# Patient Record
Sex: FEMALE | Race: OTHER | HISPANIC OR LATINO | Employment: FULL TIME | URBAN - NONMETROPOLITAN AREA
[De-identification: names, ages, dates, MRNs, and addresses within clinical notes are randomized per-mention and may not be internally consistent; named-entity substitution may affect disease eponyms.]

---

## 2019-03-13 ENCOUNTER — OFFICE VISIT (OUTPATIENT)
Dept: URGENT CARE | Facility: PHYSICIAN GROUP | Age: 19
End: 2019-03-13
Payer: MEDICAID

## 2019-03-13 VITALS
DIASTOLIC BLOOD PRESSURE: 82 MMHG | HEART RATE: 88 BPM | RESPIRATION RATE: 14 BRPM | HEIGHT: 62 IN | BODY MASS INDEX: 39.75 KG/M2 | TEMPERATURE: 97.3 F | WEIGHT: 216 LBS | OXYGEN SATURATION: 96 % | SYSTOLIC BLOOD PRESSURE: 116 MMHG

## 2019-03-13 DIAGNOSIS — L73.9 FOLLICULITIS: ICD-10-CM

## 2019-03-13 DIAGNOSIS — E66.9 OBESITY (BMI 35.0-39.9 WITHOUT COMORBIDITY): ICD-10-CM

## 2019-03-13 PROCEDURE — 99204 OFFICE O/P NEW MOD 45 MIN: CPT | Performed by: PHYSICIAN ASSISTANT

## 2019-03-13 RX ORDER — SULFAMETHOXAZOLE AND TRIMETHOPRIM 800; 160 MG/1; MG/1
1 TABLET ORAL 2 TIMES DAILY
Qty: 20 TAB | Refills: 0 | Status: SHIPPED | OUTPATIENT
Start: 2019-03-13 | End: 2019-03-23

## 2019-03-13 NOTE — PROGRESS NOTES
"Chief Complaint   Patient presents with   • Rash       HISTORY OF PRESENT ILLNESS: Patient is a 18 y.o. female who presents today because she had her face wax a few days ago and within several hours started to develop small pimples that got worse and worse.  It is on her temple areas, cheeks, chin.  She is not putting any medications on it    Patient Active Problem List    Diagnosis Date Noted   • Obesity (BMI 35.0-39.9 without comorbidity) (formerly [facility]) 03/13/2019       Allergies:Patient has no known allergies.    Current Outpatient Prescriptions Ordered in [facility]   Medication Sig Dispense Refill   • FLUOXETINE HCL PO Take  by mouth.     • HYDROXYZINE HCL PO Take  by mouth.     • sulfamethoxazole-trimethoprim (BACTRIM DS) 800-160 MG tablet Take 1 Tab by mouth 2 times a day for 10 days. 20 Tab 0     No current Epic-ordered facility-administered medications on file.        No past medical history on file.    Social History   Substance Use Topics   • Smoking status: Current Every Day Smoker     Types: Cigarettes   • Smokeless tobacco: Never Used   • Alcohol use Not on file       No family status information on file.   No family history on file.    ROS:  Review of Systems   Constitutional: Negative for fever, chills, weight loss and malaise/fatigue.   HENT: Negative for ear pain, nosebleeds, congestion, sore throat and neck pain.    Eyes: Negative for blurred vision.   Respiratory: Negative for cough, sputum production, shortness of breath and wheezing.    Cardiovascular: Negative for chest pain, palpitations, orthopnea and leg swelling.   Gastrointestinal: Negative for heartburn, nausea, vomiting and abdominal pain.   Genitourinary: Negative for dysuria, urgency and frequency.     Exam:  Blood pressure 116/82, pulse 88, temperature 36.3 °C (97.3 °F), temperature source Temporal, resp. rate 14, height 1.575 m (5' 2\"), weight 98 kg (216 lb), SpO2 96 %.  General:  Well nourished, well developed female in NAD  Head:Normocephalic, " atraumatic  Eyes: PERRLA, EOM within normal limits, no conjunctival injection, no scleral icterus, visual fields and acuity grossly intact.  Extremities: no clubbing, cyanosis, or edema.  Skin: On her face she has dozens of small erythematous pustular lesions    Please note that this dictation was created using voice recognition software. I have made every reasonable attempt to correct obvious errors, but I expect that there are errors of grammar and possibly content that I did not discover before finalizing the note.    Assessment/Plan:  1. Folliculitis  sulfamethoxazole-trimethoprim (BACTRIM DS) 800-160 MG tablet   2. Obesity (BMI 35.0-39.9 without comorbidity)  Patient identified as having weight management issue.  Appropriate orders and counseling given.   Mild soap and water.    Followup with primary care in the next 7-10 days if not significantly improving, return to the urgent care or go to the emergency room sooner for any worsening of symptoms.

## 2019-03-13 NOTE — PATIENT INSTRUCTIONS
"Smoking Cessation, Tips for Success  If you are ready to quit smoking, congratulations! You have chosen to help yourself be healthier. Cigarettes bring nicotine, tar, carbon monoxide, and other irritants into your body. Your lungs, heart, and blood vessels will be able to work better without these poisons. There are many different ways to quit smoking. Nicotine gum, nicotine patches, a nicotine inhaler, or nicotine nasal spray can help with physical craving. Hypnosis, support groups, and medicines help break the habit of smoking.  WHAT THINGS CAN I DO TO MAKE QUITTING EASIER?   Here are some tips to help you quit for good:  · Pick a date when you will quit smoking completely. Tell all of your friends and family about your plan to quit on that date.  · Do not try to slowly cut down on the number of cigarettes you are smoking. Pick a quit date and quit smoking completely starting on that day.  · Throw away all cigarettes.    · Clean and remove all ashtrays from your home, work, and car.  · On a card, write down your reasons for quitting. Carry the card with you and read it when you get the urge to smoke.  · Cleanse your body of nicotine. Drink enough water and fluids to keep your urine clear or pale yellow. Do this after quitting to flush the nicotine from your body.  · Learn to predict your moods. Do not let a bad situation be your excuse to have a cigarette. Some situations in your life might tempt you into wanting a cigarette.  · Never have \"just one\" cigarette. It leads to wanting another and another. Remind yourself of your decision to quit.  · Change habits associated with smoking. If you smoked while driving or when feeling stressed, try other activities to replace smoking. Stand up when drinking your coffee. Brush your teeth after eating. Sit in a different chair when you read the paper. Avoid alcohol while trying to quit, and try to drink fewer caffeinated beverages. Alcohol and caffeine may urge you to " "smoke.  · Avoid foods and drinks that can trigger a desire to smoke, such as sugary or spicy foods and alcohol.  · Ask people who smoke not to smoke around you.  · Have something planned to do right after eating or having a cup of coffee. For example, plan to take a walk or exercise.  · Try a relaxation exercise to calm you down and decrease your stress. Remember, you may be tense and nervous for the first 2 weeks after you quit, but this will pass.  · Find new activities to keep your hands busy. Play with a pen, coin, or rubber band. Doodle or draw things on paper.  · Brush your teeth right after eating. This will help cut down on the craving for the taste of tobacco after meals. You can also try mouthwash.    · Use oral substitutes in place of cigarettes. Try using lemon drops, carrots, cinnamon sticks, or chewing gum. Keep them handy so they are available when you have the urge to smoke.  · When you have the urge to smoke, try deep breathing.  · Designate your home as a nonsmoking area.  · If you are a heavy smoker, ask your health care provider about a prescription for nicotine chewing gum. It can ease your withdrawal from nicotine.  · Reward yourself. Set aside the cigarette money you save and buy yourself something nice.  · Look for support from others. Join a support group or smoking cessation program. Ask someone at home or at work to help you with your plan to quit smoking.  · Always ask yourself, \"Do I need this cigarette or is this just a reflex?\" Tell yourself, \"Today, I choose not to smoke,\" or \"I do not want to smoke.\" You are reminding yourself of your decision to quit.  · Do not replace cigarette smoking with electronic cigarettes (commonly called e-cigarettes). The safety of e-cigarettes is unknown, and some may contain harmful chemicals.  · If you relapse, do not give up! Plan ahead and think about what you will do the next time you get the urge to smoke.  HOW WILL I FEEL WHEN I QUIT SMOKING?  You " may have symptoms of withdrawal because your body is used to nicotine (the addictive substance in cigarettes). You may crave cigarettes, be irritable, feel very hungry, cough often, get headaches, or have difficulty concentrating. The withdrawal symptoms are only temporary. They are strongest when you first quit but will go away within 10-14 days. When withdrawal symptoms occur, stay in control. Think about your reasons for quitting. Remind yourself that these are signs that your body is healing and getting used to being without cigarettes. Remember that withdrawal symptoms are easier to treat than the major diseases that smoking can cause.   Even after the withdrawal is over, expect periodic urges to smoke. However, these cravings are generally short lived and will go away whether you smoke or not. Do not smoke!  WHAT RESOURCES ARE AVAILABLE TO HELP ME QUIT SMOKING?  Your health care provider can direct you to community resources or hospitals for support, which may include:  · Group support.  · Education.  · Hypnosis.  · Therapy.     This information is not intended to replace advice given to you by your health care provider. Make sure you discuss any questions you have with your health care provider.     Document Released: 09/15/2005 Document Revised: 01/08/2016 Document Reviewed: 06/05/2014  [employer] Interactive Patient Education ©2016 [employer]

## 2019-06-03 ENCOUNTER — OFFICE VISIT (OUTPATIENT)
Dept: URGENT CARE | Facility: PHYSICIAN GROUP | Age: 19
End: 2019-06-03
Payer: MEDICAID

## 2019-06-03 VITALS
RESPIRATION RATE: 16 BRPM | BODY MASS INDEX: 41.77 KG/M2 | OXYGEN SATURATION: 95 % | DIASTOLIC BLOOD PRESSURE: 82 MMHG | WEIGHT: 227 LBS | HEART RATE: 100 BPM | TEMPERATURE: 97.1 F | HEIGHT: 62 IN | SYSTOLIC BLOOD PRESSURE: 122 MMHG

## 2019-06-03 DIAGNOSIS — J02.9 EXUDATIVE PHARYNGITIS: ICD-10-CM

## 2019-06-03 PROCEDURE — 99214 OFFICE O/P EST MOD 30 MIN: CPT | Performed by: PHYSICIAN ASSISTANT

## 2019-06-03 RX ORDER — AMOXICILLIN 875 MG/1
875 TABLET, COATED ORAL 2 TIMES DAILY
Qty: 20 TAB | Refills: 0 | Status: SHIPPED | OUTPATIENT
Start: 2019-06-03 | End: 2019-06-13

## 2019-06-03 NOTE — PROGRESS NOTES
"Chief Complaint   Patient presents with   • Pharyngitis       HISTORY OF PRESENT ILLNESS: Patient is a 18 y.o. female who presents today because she has a 2 to 3-day history of sore throat.  She is a white pockets on her throat yesterday so she came in for evaluation today.  She has been taking some ibuprofen which did not really help.    Patient Active Problem List    Diagnosis Date Noted   • Obesity (BMI 35.0-39.9 without comorbidity) ([facility]) 03/13/2019       Allergies:Patient has no known allergies.    Current Outpatient Prescriptions Ordered in [facility]   Medication Sig Dispense Refill   • amoxicillin (AMOXIL) 875 MG tablet Take 1 Tab by mouth 2 times a day for 10 days. 20 Tab 0   • FLUOXETINE HCL PO Take  by mouth.     • HYDROXYZINE HCL PO Take  by mouth.       No current Epic-ordered facility-administered medications on file.        No past medical history on file.    Social History   Substance Use Topics   • Smoking status: Current Every Day Smoker     Types: Cigarettes   • Smokeless tobacco: Never Used   • Alcohol use Not on file       No family status information on file.   No family history on file.    ROS:  Review of Systems   Constitutional: Patient reports subjective fever, no chills, weight loss and malaise/fatigue.   HENT: Negative for ear pain, nosebleeds, congestion, positive for sore throat and neck pain.    Eyes: Negative for blurred vision.   Respiratory: Negative for cough, sputum production, shortness of breath and wheezing.    Cardiovascular: Negative for chest pain, palpitations, orthopnea and leg swelling.   Gastrointestinal: Negative for heartburn, nausea, vomiting and abdominal pain.   Genitourinary: Negative for dysuria, urgency and frequency.     Exam:  /82 (BP Location: Right arm, Patient Position: Sitting, BP Cuff Size: Large adult)   Pulse 100   Temp 36.2 °C (97.1 °F) (Temporal)   Resp 16   Ht 1.575 m (5' 2\")   Wt 103 kg (227 lb)   SpO2 95%   General:  Well nourished, well " developed female in NAD  Head:Normocephalic, atraumatic  Eyes: PERRLA, EOM within normal limits, no conjunctival injection, no scleral icterus, visual fields and acuity grossly intact.  Ears: Normal shape and symmetry, no tenderness, no discharge. External canals are without any significant edema or erythema. Tympanic membranes are without any inflammation, no effusion. Gross auditory acuity is intact  Nose: Symmetrical without tenderness, no discharge.  Mouth: reasonable hygiene, she has pharyngeal and tonsillar erythema, bilateral exudates and bilateral tonsillar enlargement.  Uvula is midline  Neck: There is bilateral anterior cervical lymph node enlargement and tenderness, range of motion within normal limits, no tracheal deviation. No obvious thyroid enlargement.  Extremities: no clubbing, cyanosis, or edema.    Please note that this dictation was created using voice recognition software. I have made every reasonable attempt to correct obvious errors, but I expect that there are errors of grammar and possibly content that I did not discover before finalizing the note.    Assessment/Plan:  1. Exudative pharyngitis  amoxicillin (AMOXIL) 875 MG tablet   Tylenol or ibuprofen as tolerated.    Followup with primary care in the next 7-10 days if not significantly improving, return to the urgent care or go to the emergency room sooner for any worsening of symptoms.

## 2019-06-03 NOTE — PATIENT INSTRUCTIONS
Steps to Quit Smoking  Smoking tobacco can be harmful to your health and can affect almost every organ in your body. Smoking puts you, and those around you, at risk for developing many serious chronic diseases. Quitting smoking is difficult, but it is one of the best things that you can do for your health. It is never too late to quit.  What are the benefits of quitting smoking?  When you quit smoking, you lower your risk of developing serious diseases and conditions, such as:  · Lung cancer or lung disease, such as COPD.  · Heart disease.  · Stroke.  · Heart attack.  · Infertility.  · Osteoporosis and bone fractures.  Additionally, symptoms such as coughing, wheezing, and shortness of breath may get better when you quit. You may also find that you get sick less often because your body is stronger at fighting off colds and infections. If you are pregnant, quitting smoking can help to reduce your chances of having a baby of low birth weight.  How do I get ready to quit?  When you decide to quit smoking, create a plan to make sure that you are successful. Before you quit:  · Pick a date to quit. Set a date within the next two weeks to give you time to prepare.  · Write down the reasons why you are quitting. Keep this list in places where you will see it often, such as on your bathroom mirror or in your car or wallet.  · Identify the people, places, things, and activities that make you want to smoke (triggers) and avoid them. Make sure to take these actions:  ¨ Throw away all cigarettes at home, at work, and in your car.  ¨ Throw away smoking accessories, such as ashtrays and lighters.  ¨ Clean your car and make sure to empty the ashtray.  ¨ Clean your home, including curtains and carpets.  · Tell your family, friends, and coworkers that you are quitting. Support from your loved ones can make quitting easier.  · Talk with your health care provider about your options for quitting smoking.  · Find out what treatment  options are covered by your health insurance.  What strategies can I use to quit smoking?  Talk with your healthcare provider about different strategies to quit smoking. Some strategies include:  · Quitting smoking altogether instead of gradually lessening how much you smoke over a period of time. Research shows that quitting “cold turkey” is more successful than gradually quitting.  · Attending in-person counseling to help you build problem-solving skills. You are more likely to have success in quitting if you attend several counseling sessions. Even short sessions of 10 minutes can be effective.  · Finding resources and support systems that can help you to quit smoking and remain smoke-free after you quit. These resources are most helpful when you use them often. They can include:  ¨ Online chats with a counselor.  ¨ Telephone quitlines.  ¨ Printed self-help materials.  ¨ Support groups or group counseling.  ¨ Text messaging programs.  ¨ Mobile phone applications.  · Taking medicines to help you quit smoking. (If you are pregnant or breastfeeding, talk with your health care provider first.) Some medicines contain nicotine and some do not. Both types of medicines help with cravings, but the medicines that include nicotine help to relieve withdrawal symptoms. Your health care provider may recommend:  ¨ Nicotine patches, gum, or lozenges.  ¨ Nicotine inhalers or sprays.  ¨ Non-nicotine medicine that is taken by mouth.  Talk with your health care provider about combining strategies, such as taking medicines while you are also receiving in-person counseling. Using these two strategies together makes you more likely to succeed in quitting than if you used either strategy on its own.  If you are pregnant or breastfeeding, talk with your health care provider about finding counseling or other support strategies to quit smoking. Do not take medicine to help you quit smoking unless told to do so by your health care  provider.  What things can I do to make it easier to quit?  Quitting smoking might feel overwhelming at first, but there is a lot that you can do to make it easier. Take these important actions:  · Reach out to your family and friends and ask that they support and encourage you during this time. Call telephone quitlines, reach out to support groups, or work with a counselor for support.  · Ask people who smoke to avoid smoking around you.  · Avoid places that trigger you to smoke, such as bars, parties, or smoke-break areas at work.  · Spend time around people who do not smoke.  · Lessen stress in your life, because stress can be a smoking trigger for some people. To lessen stress, try:  ¨ Exercising regularly.  ¨ Deep-breathing exercises.  ¨ Yoga.  ¨ Meditating.  ¨ Performing a body scan. This involves closing your eyes, scanning your body from head to toe, and noticing which parts of your body are particularly tense. Purposefully relax the muscles in those areas.  · Download or purchase mobile phone or tablet apps (applications) that can help you stick to your quit plan by providing reminders, tips, and encouragement. There are many free apps, such as QuitGuide from the CDC (Centers for Disease Control and Prevention). You can find other support for quitting smoking (smoking cessation) through smokefree.gov and other websites.  How will I feel when I quit smoking?  Within the first 24 hours of quitting smoking, you may start to feel some withdrawal symptoms. These symptoms are usually most noticeable 2-3 days after quitting, but they usually do not last beyond 2-3 weeks. Changes or symptoms that you might experience include:  · Mood swings.  · Restlessness, anxiety, or irritation.  · Difficulty concentrating.  · Dizziness.  · Strong cravings for sugary foods in addition to nicotine.  · Mild weight gain.  · Constipation.  · Nausea.  · Coughing or a sore throat.  · Changes in how your medicines work in your  body.  · A depressed mood.  · Difficulty sleeping (insomnia).  After the first 2-3 weeks of quitting, you may start to notice more positive results, such as:  · Improved sense of smell and taste.  · Decreased coughing and sore throat.  · Slower heart rate.  · Lower blood pressure.  · Clearer skin.  · The ability to breathe more easily.  · Fewer sick days.  Quitting smoking is very challenging for most people. Do not get discouraged if you are not successful the first time. Some people need to make many attempts to quit before they achieve long-term success. Do your best to stick to your quit plan, and talk with your health care provider if you have any questions or concerns.  This information is not intended to replace advice given to you by your health care provider. Make sure you discuss any questions you have with your health care provider.  Document Released: 12/12/2002 Document Revised: 08/15/2017 Document Reviewed: 05/03/2016  [employer] Interactive Patient Education © 2017 [employer]

## 2023-05-18 ENCOUNTER — OFFICE VISIT (OUTPATIENT)
Dept: URGENT CARE | Facility: PHYSICIAN GROUP | Age: 23
End: 2023-05-18
Payer: MEDICAID

## 2023-05-18 VITALS
HEIGHT: 63 IN | HEART RATE: 113 BPM | SYSTOLIC BLOOD PRESSURE: 122 MMHG | BODY MASS INDEX: 41.46 KG/M2 | TEMPERATURE: 97.2 F | OXYGEN SATURATION: 95 % | DIASTOLIC BLOOD PRESSURE: 80 MMHG | WEIGHT: 234 LBS | RESPIRATION RATE: 16 BRPM

## 2023-05-18 DIAGNOSIS — F17.210 TOBACCO DEPENDENCE DUE TO CIGARETTES: ICD-10-CM

## 2023-05-18 DIAGNOSIS — R11.2 NAUSEA VOMITING AND DIARRHEA: ICD-10-CM

## 2023-05-18 DIAGNOSIS — R19.7 NAUSEA VOMITING AND DIARRHEA: ICD-10-CM

## 2023-05-18 DIAGNOSIS — B34.9 ACUTE VIRAL SYNDROME: ICD-10-CM

## 2023-05-18 PROCEDURE — 3074F SYST BP LT 130 MM HG: CPT | Performed by: NURSE PRACTITIONER

## 2023-05-18 PROCEDURE — 99213 OFFICE O/P EST LOW 20 MIN: CPT | Performed by: NURSE PRACTITIONER

## 2023-05-18 PROCEDURE — 3079F DIAST BP 80-89 MM HG: CPT | Performed by: NURSE PRACTITIONER

## 2023-05-18 RX ORDER — ONDANSETRON 4 MG/1
TABLET, ORALLY DISINTEGRATING ORAL
Qty: 15 TABLET | Refills: 0 | Status: SHIPPED | OUTPATIENT
Start: 2023-05-18

## 2023-05-18 RX ORDER — DICYCLOMINE HCL 20 MG
TABLET ORAL
Qty: 20 TABLET | Refills: 0 | Status: SHIPPED | OUTPATIENT
Start: 2023-05-18

## 2023-05-18 RX ORDER — NORGESTIMATE AND ETHINYL ESTRADIOL 0.25-0.035
1 KIT ORAL
COMMUNITY
Start: 2023-04-11

## 2023-05-18 ASSESSMENT — ENCOUNTER SYMPTOMS
MYALGIAS: 1
NAUSEA: 1
FEVER: 0
CHILLS: 1
ABDOMINAL PAIN: 1
HEADACHES: 0
VOMITING: 1
SORE THROAT: 0
DIARRHEA: 1
BLOOD IN STOOL: 0
DIZZINESS: 1

## 2023-05-18 NOTE — PROGRESS NOTES
Patient has consented to treatment and for use of patient information for treatment and billing purposes.    Chief Complaint:    Chief Complaint   Patient presents with    Nausea     X2 days diarrhea and nausea,able to keep fluids down but not food has not had solid food for more then 1 day, stomach cramps, body aches, vomiting        History of Present Illness: 22 y.o.  female presents to clinic with 3 days history of N/V/D, abdominal cramping, body aches, chills, fatigue, and mild dizziness.  Patient states that her diarrhea is very watery in nature.  She vomited approximately 3 times yesterday and once today.  She did try taking over-the-counter Imodium without any symptom relief.  She has been able to hold down small sips of water.  She is intolerant to food.  She states that she is on birth control but is sexually active.      Review of Systems   Constitutional:  Positive for chills and malaise/fatigue. Negative for fever.   HENT:  Negative for sore throat.    Gastrointestinal:  Positive for abdominal pain, diarrhea, nausea and vomiting. Negative for blood in stool and melena.   Musculoskeletal:  Positive for myalgias.   Neurological:  Positive for dizziness. Negative for headaches.       Medications, Allergies, and current problem list reviewed today in Epic.    Physical Exam:    Vitals:    05/18/23 1530   BP: 122/80   Pulse: (!) 113   Resp: 16   Temp: 36.2 °C (97.2 °F)   SpO2: 95%             Physical Exam  Constitutional:       General: She is not in acute distress.     Appearance: She is not ill-appearing or toxic-appearing.   HENT:      Head: Normocephalic.      Nose: Nose normal.      Mouth/Throat:      Mouth: Mucous membranes are moist.   Eyes:      Extraocular Movements: Extraocular movements intact.      Conjunctiva/sclera: Conjunctivae normal.      Pupils: Pupils are equal, round, and reactive to light.   Cardiovascular:      Rate and Rhythm: Normal rate.   Pulmonary:      Effort: Pulmonary effort is  normal.   Abdominal:      General: Abdomen is flat. Bowel sounds are increased.      Palpations: Abdomen is soft. There is no hepatomegaly or splenomegaly.      Tenderness: There is abdominal tenderness in the epigastric area. There is no guarding or rebound. Negative signs include Bran's sign and McBurney's sign.   Musculoskeletal:         General: Normal range of motion.      Cervical back: Normal range of motion and neck supple.   Skin:     General: Skin is warm and dry.   Neurological:      General: No focal deficit present.      Mental Status: She is alert and oriented to person, place, and time.   Psychiatric:         Mood and Affect: Mood normal.         Behavior: Behavior normal.            Medical Decision Making and Plan:  I personally reviewed prior external notes and test results pertinent to today's visit.   Shared decision-making was utilized with patient did develop treatment plan and clinic course.     Ekaterina, 22 y.o. female, presents to clinic with nausea, vomiting, and diarrhea.  HPI and physical exam findings are consistent with viral syndrome such as norovirus.  Patient's tachycardic in clinic secondary to mild dehydration.  Patient's mucous membranes are moist and she is tolerating small sips of p.o. fluids.  Patient was given prescription of Zofran and Bentyl.  Low suspicion at this time for appendicitis, peritonitis, or cholecystitis.  Recommended patient stay on clear liquid diet for at least 10 to 12 hours and increase as tolerated.    Differential diagnosis, natural history, supportive care, and indications for immediate follow-up discussed.    The patient remained stable during the urgent care visit.      1. Acute viral syndrome    2. Nausea vomiting and diarrhea  - POCT PREGNANCY  - ondansetron (ZOFRAN ODT) 4 MG TABLET DISPERSIBLE; 1 TAB, ALLOW TO DISINTEGRATE IN MOUTH, EVERY 8 HOURS ONLY IF NEEDED FOR NAUSEA OR VOMITING.  Dispense: 15 Tablet; Refill: 0  - dicyclomine (BENTYL) 20 MG  Tab; 1 TAB BY MOUTH EVERY 6 HOURS ONLY IF NEEDED FOR DIARRHEA AND/OR ABDOMINAL CRAMPS.  Dispense: 20 Tablet; Refill: 0    3. Tobacco dependence due to cigarettes    Other orders  - ESTARYLLA 0.25-35 MG-MCG per tablet; Take 1 Tablet by mouth every day.      Patient was encouraged to monitor symptoms closely. Those signs and symptoms which would warrant concern and mandate seeking a higher level of service through the emergency department discussed at length.  Patient stated agreement and understanding of this plan of care.    Follow up:  Advised the patient to follow-up with the primary care physician for recheck, reevaluation, and consideration of further management.  Patient verbalized understanding of treatment plan and has no further questions regarding care.      Disposition:  Home in stable condition       Voice Recognition Disclaimer:  Portions of this document were created using voice recognition software. The software does have a chance of producing errors of grammar and possibly content. I have made every reasonable attempt to correct obvious errors, but there may be errors of grammar and possibly content that I did not discover before finalizing the documentation.

## 2023-05-18 NOTE — LETTER
[facility] URGENT CARE [place]  [facility] [address]     May 18, 2023    Patient: Esther Lr   YOB: 2000   Date of Visit: 5/18/2023       To Whom It May Concern:    Esther Lr was seen and treated in our department on 5/18/2023.     Sincerely,     JEES Morris.

## 2023-05-22 ENCOUNTER — OFFICE VISIT (OUTPATIENT)
Dept: URGENT CARE | Facility: PHYSICIAN GROUP | Age: 23
End: 2023-05-22
Payer: MEDICAID

## 2023-05-22 VITALS
HEART RATE: 114 BPM | TEMPERATURE: 96.7 F | DIASTOLIC BLOOD PRESSURE: 80 MMHG | WEIGHT: 225 LBS | SYSTOLIC BLOOD PRESSURE: 112 MMHG | HEIGHT: 63 IN | BODY MASS INDEX: 39.87 KG/M2 | OXYGEN SATURATION: 94 % | RESPIRATION RATE: 14 BRPM

## 2023-05-22 DIAGNOSIS — R19.7 NAUSEA VOMITING AND DIARRHEA: ICD-10-CM

## 2023-05-22 DIAGNOSIS — R11.2 NAUSEA VOMITING AND DIARRHEA: ICD-10-CM

## 2023-05-22 DIAGNOSIS — N30.01 ACUTE CYSTITIS WITH HEMATURIA: ICD-10-CM

## 2023-05-22 DIAGNOSIS — R10.9 ABDOMINAL CRAMPING: ICD-10-CM

## 2023-05-22 LAB
APPEARANCE UR: CLEAR
BILIRUB UR STRIP-MCNC: NORMAL MG/DL
COLOR UR AUTO: NORMAL
GLUCOSE UR STRIP.AUTO-MCNC: 100 MG/DL
KETONES UR STRIP.AUTO-MCNC: 40 MG/DL
LEUKOCYTE ESTERASE UR QL STRIP.AUTO: NORMAL
NITRITE UR QL STRIP.AUTO: POSITIVE
PH UR STRIP.AUTO: 5 [PH] (ref 5–8)
POCT INT CON NEG: NEGATIVE
POCT INT CON POS: POSITIVE
POCT URINE PREGNANCY TEST: NEGATIVE
PROT UR QL STRIP: >=300 MG/DL
RBC UR QL AUTO: NORMAL
SP GR UR STRIP.AUTO: 1.01
UROBILINOGEN UR STRIP-MCNC: 4 MG/DL

## 2023-05-22 PROCEDURE — 81025 URINE PREGNANCY TEST: CPT | Performed by: NURSE PRACTITIONER

## 2023-05-22 PROCEDURE — 99214 OFFICE O/P EST MOD 30 MIN: CPT | Mod: 25 | Performed by: NURSE PRACTITIONER

## 2023-05-22 PROCEDURE — 3074F SYST BP LT 130 MM HG: CPT | Performed by: NURSE PRACTITIONER

## 2023-05-22 PROCEDURE — 3079F DIAST BP 80-89 MM HG: CPT | Performed by: NURSE PRACTITIONER

## 2023-05-22 PROCEDURE — 81002 URINALYSIS NONAUTO W/O SCOPE: CPT | Performed by: NURSE PRACTITIONER

## 2023-05-22 RX ORDER — METRONIDAZOLE 500 MG/1
500 TABLET ORAL 2 TIMES DAILY
Qty: 14 TABLET | Refills: 0 | Status: CANCELLED | OUTPATIENT
Start: 2023-05-22 | End: 2023-05-29

## 2023-05-22 RX ORDER — CEFDINIR 300 MG/1
300 CAPSULE ORAL 2 TIMES DAILY
Qty: 14 CAPSULE | Refills: 0 | Status: SHIPPED | OUTPATIENT
Start: 2023-05-22 | End: 2023-05-29

## 2023-05-22 ASSESSMENT — ENCOUNTER SYMPTOMS
SORE THROAT: 0
FLANK PAIN: 0
HEADACHES: 0
ABDOMINAL PAIN: 1
FEVER: 0
DIZZINESS: 0
DIARRHEA: 1
VOMITING: 1
NAUSEA: 1
MYALGIAS: 0
CHILLS: 0

## 2023-05-22 NOTE — LETTER
[facility] URGENT CARE [place]  [facility] [address]     May 22, 2023    Patient: Esther Lr   YOB: 2000   Date of Visit: 5/22/2023       To Whom It May Concern:    Esther Lr was seen and treated in our department on 5/22/2023.  Will need to be excused from work due to illness and may return on 5/23/2023 without restrictions.    Sincerely,     JESE Morris.

## 2023-05-22 NOTE — PROGRESS NOTES
Patient has consented to treatment and for use of patient information for treatment and billing purposes.    Chief Complaint:    Chief Complaint   Patient presents with    Emesis     Still having the same symptoms as last time, Diarrhea, vomiting, nausea, not able to eat solids with out vomiting        History of Present Illness: 22 y.o.  female presents to clinic with with continuing symptoms of intermittent diarrhea, nausea, vomiting, and epigastric cramping.    Patient states that her diarrhea is watery in nature, denies any blood or mucus.  Having about 4-5 diarrhea episodes per day.    Patient was seen last week on 5/18/2023 with similar symptoms but also had body aches, chills, fatigue and dizziness that is now resolved.  Patient states that she did start her period last night.  He is having about 4-5 vomiting episodes per day.  She did try one Zofran which did help for 4 to 6 hours for her nausea.  She states that her symptoms do seem to be improving.  She has not picked up prescription of Bentyl and Zofran that were prescribed last visit.  She is unable to hold down solid foods but is holding down broths and liquid.  She states that the smell of food causes nausea.  She has not had any fevers, chills, dysuria, or frequency with urination.      Review of Systems   Constitutional:  Negative for chills, fever and malaise/fatigue.   HENT:  Negative for sore throat.    Gastrointestinal:  Positive for abdominal pain, diarrhea, nausea and vomiting.   Genitourinary:  Negative for dysuria, flank pain, frequency, hematuria and urgency.   Musculoskeletal:  Negative for myalgias.   Neurological:  Negative for dizziness and headaches.       Medications, Allergies, and current problem list reviewed today in Epic.    Physical Exam:    Vitals:    05/22/23 1442   BP: 112/80   Pulse: (!) 114   Resp: 14   Temp: 35.9 °C (96.7 °F)   SpO2: 94%             Physical Exam  Constitutional:       General: She is not in acute  distress.     Appearance: She is not ill-appearing or toxic-appearing.   HENT:      Head: Normocephalic.      Nose: Nose normal.      Mouth/Throat:      Mouth: Mucous membranes are moist.   Eyes:      Extraocular Movements: Extraocular movements intact.      Conjunctiva/sclera: Conjunctivae normal.      Pupils: Pupils are equal, round, and reactive to light.   Cardiovascular:      Rate and Rhythm: Normal rate.   Pulmonary:      Effort: Pulmonary effort is normal.   Abdominal:      General: Abdomen is flat. Bowel sounds are decreased.      Palpations: Abdomen is soft. There is no hepatomegaly or splenomegaly.      Tenderness: There is no abdominal tenderness. There is no right CVA tenderness, left CVA tenderness, guarding or rebound. Negative signs include Bran's sign and McBurney's sign.   Musculoskeletal:         General: Normal range of motion.      Cervical back: Normal range of motion and neck supple.   Skin:     General: Skin is warm and dry.   Neurological:      General: No focal deficit present.      Mental Status: She is alert and oriented to person, place, and time.   Psychiatric:         Mood and Affect: Mood normal.         Behavior: Behavior normal.          Diagnostics:  Results for orders placed or performed in visit on 05/22/23   POCT Urinalysis   Result Value Ref Range    POC Color Red Negative    POC Appearance Clear Negative    POC Glucose 100 Negative mg/dL    POC Bilirubin Large Negative mg/dL    POC Ketones 40 Negative mg/dL    POC Specific Gravity 1.015 <1.005 - >1.030    POC Blood Large Negative    POC Urine PH 5.0 5.0 - 8.0    POC Protein >=300 Negative mg/dL    POC Urobiligen 4.0 Negative (0.2) mg/dL    POC Nitrites Positive Negative    POC Leukocyte Esterase Large Negative   POCT PREGNANCY   Result Value Ref Range    POC Urine Pregnancy Test Negative     Internal Control Positive Positive     Internal Control Negative Negative           Medical Decision Making and Plan:  I personally  reviewed prior external notes and test results pertinent to today's visit.   Shared decision-making was utilized with patient did develop treatment plan and clinic course.     Ekaterina, 22 y.o. female, presents to clinic with ongoing nausea, vomiting, and diarrhea for the past 7 days.   Patient was last seen on 5/18/2023 for similar symptoms.  Patient did not  previous prescription of Zofran and Bentyl.    Recommended patient go ahead and  prescriptions and start them to help with alleviation of symptoms.  POCT urinalysis positive for UTI, urine is red in color most likely due to menses.  Low suspicion at this time for pyelonephritis as patient is nontender in bilateral flanks.  POCT hCG negative  Prescription of Omnicef sent to pharmacy.  Recommended patient increase water to 64 ounces daily.    Differential diagnosis, natural history, supportive care, and indications for immediate follow-up discussed.    The patient remained stable during the urgent care visit.          1. Nausea vomiting and diarrhea    2. Abdominal cramping  - POCT Urinalysis  - POCT PREGNANCY    3. Acute cystitis with hematuria  - cefdinir (OMNICEF) 300 MG Cap; Take 1 Capsule by mouth 2 times a day for 7 days.  Dispense: 14 Capsule; Refill: 0      Patient was encouraged to monitor symptoms closely. Those signs and symptoms which would warrant concern and mandate seeking a higher level of service through the emergency department discussed at length.  Patient stated agreement and understanding of this plan of care.    Follow up:  Advised the patient to follow-up with the primary care physician for recheck, reevaluation, and consideration of further management.  Patient verbalized understanding of treatment plan and has no further questions regarding care.    Follow-up in urgent care or emergency department if symptoms continue or worsen in 3-5 days      Disposition:  Home in stable condition       My total time spent caring for the  patient on the day of the encounter was 32 minutes.   This does not include time spent on separately billable procedures/tests.      Voice Recognition Disclaimer:  Portions of this document were created using voice recognition software. The software does have a chance of producing errors of grammar and possibly content. I have made every reasonable attempt to correct obvious errors, but there may be errors of grammar and possibly content that I did not discover before finalizing the documentation.     Continue synthroid, repeat TFTs, FU

## 2024-11-23 ENCOUNTER — HOSPITAL ENCOUNTER (OUTPATIENT)
Facility: MEDICAL CENTER | Age: 24
End: 2024-11-23
Payer: MEDICAID

## 2024-11-23 ENCOUNTER — OFFICE VISIT (OUTPATIENT)
Dept: URGENT CARE | Facility: CLINIC | Age: 24
End: 2024-11-23
Payer: MEDICAID

## 2024-11-23 VITALS
HEIGHT: 62 IN | TEMPERATURE: 99 F | RESPIRATION RATE: 16 BRPM | DIASTOLIC BLOOD PRESSURE: 88 MMHG | HEART RATE: 120 BPM | OXYGEN SATURATION: 98 % | WEIGHT: 229 LBS | BODY MASS INDEX: 42.14 KG/M2 | SYSTOLIC BLOOD PRESSURE: 122 MMHG

## 2024-11-23 DIAGNOSIS — N76.0 BV (BACTERIAL VAGINOSIS): ICD-10-CM

## 2024-11-23 DIAGNOSIS — N89.8 VAGINAL IRRITATION: ICD-10-CM

## 2024-11-23 DIAGNOSIS — B96.89 BV (BACTERIAL VAGINOSIS): ICD-10-CM

## 2024-11-23 DIAGNOSIS — N30.01 ACUTE CYSTITIS WITH HEMATURIA: ICD-10-CM

## 2024-11-23 LAB
APPEARANCE UR: CLEAR
BILIRUB UR STRIP-MCNC: NORMAL MG/DL
COLOR UR AUTO: NORMAL
GLUCOSE UR STRIP.AUTO-MCNC: NORMAL MG/DL
KETONES UR STRIP.AUTO-MCNC: NORMAL MG/DL
LEUKOCYTE ESTERASE UR QL STRIP.AUTO: NORMAL
NITRITE UR QL STRIP.AUTO: POSITIVE
PH UR STRIP.AUTO: 6 [PH] (ref 5–8)
POCT INT CON NEG: NEGATIVE
POCT INT CON POS: POSITIVE
POCT URINE PREGNANCY TEST: NEGATIVE
PROT UR QL STRIP: 30 MG/DL
RBC UR QL AUTO: NORMAL
SP GR UR STRIP.AUTO: 1.02
UROBILINOGEN UR STRIP-MCNC: 0.2 MG/DL

## 2024-11-23 PROCEDURE — 87510 GARDNER VAG DNA DIR PROBE: CPT

## 2024-11-23 PROCEDURE — 87480 CANDIDA DNA DIR PROBE: CPT

## 2024-11-23 PROCEDURE — 3079F DIAST BP 80-89 MM HG: CPT

## 2024-11-23 PROCEDURE — 81025 URINE PREGNANCY TEST: CPT

## 2024-11-23 PROCEDURE — 87660 TRICHOMONAS VAGIN DIR PROBE: CPT

## 2024-11-23 PROCEDURE — 81002 URINALYSIS NONAUTO W/O SCOPE: CPT

## 2024-11-23 PROCEDURE — 99213 OFFICE O/P EST LOW 20 MIN: CPT | Mod: 25

## 2024-11-23 PROCEDURE — 87491 CHLMYD TRACH DNA AMP PROBE: CPT

## 2024-11-23 PROCEDURE — 3074F SYST BP LT 130 MM HG: CPT

## 2024-11-23 PROCEDURE — 87591 N.GONORRHOEAE DNA AMP PROB: CPT

## 2024-11-23 RX ORDER — NITROFURANTOIN 25; 75 MG/1; MG/1
100 CAPSULE ORAL 2 TIMES DAILY
Qty: 10 CAPSULE | Refills: 0 | Status: SHIPPED | OUTPATIENT
Start: 2024-11-23 | End: 2024-11-28

## 2024-11-24 LAB
C TRACH DNA GENITAL QL NAA+PROBE: NEGATIVE
CANDIDA DNA VAG QL PROBE+SIG AMP: NEGATIVE
G VAGINALIS DNA VAG QL PROBE+SIG AMP: POSITIVE
N GONORRHOEA DNA GENITAL QL NAA+PROBE: NEGATIVE
SPECIMEN SOURCE: NORMAL
T VAGINALIS DNA VAG QL PROBE+SIG AMP: NEGATIVE

## 2024-11-24 RX ORDER — METRONIDAZOLE 500 MG/1
500 TABLET ORAL 2 TIMES DAILY
Qty: 14 TABLET | Refills: 0 | Status: SHIPPED | OUTPATIENT
Start: 2024-11-24 | End: 2024-12-01

## 2024-11-24 NOTE — PROGRESS NOTES
"Chief Complaint   Patient presents with    UTI     Frequent urination, lower abdomen area x 1 day    Exposure to STD         Subjective:   HISTORY OF PRESENT ILLNESS: Esther Lr is a 24 y.o. female who presents for frequent and painful urination x 1 day.  She would also like to screen for STD's she is sexually active.    Patient denies vaginal discharge, pelvic pain, flank pain or fevers    Medications, Allergies, current problem list, Social and Family history reviewed today in Epic.     Objective:     /88   Pulse (!) 120   Temp 37.2 °C (99 °F) (Temporal)   Resp 16   Ht 1.575 m (5' 2\")   Wt 104 kg (229 lb)   SpO2 98%     Physical Exam  Vitals reviewed.   Constitutional:       General: She is not in acute distress.     Appearance: Normal appearance. She is not ill-appearing or toxic-appearing.   HENT:      Mouth/Throat:      Mouth: Mucous membranes are moist.   Cardiovascular:      Rate and Rhythm: Normal rate.   Pulmonary:      Effort: Pulmonary effort is normal.   Abdominal:      Palpations: Abdomen is soft.      Tenderness: There is abdominal tenderness in the suprapubic area. There is no right CVA tenderness, left CVA tenderness, guarding or rebound.   Genitourinary:     Comments: Deferred per pt  Skin:     General: Skin is warm and dry.      Capillary Refill: Capillary refill takes less than 2 seconds.   Neurological:      Mental Status: She is alert and oriented to person, place, and time.   Psychiatric:         Mood and Affect: Mood normal.          Assessment/Plan:     Diagnosis and associated orders    I personally reviewed prior external notes and test results pertinent to today's visit.     1. Vaginal irritation  POCT Urinalysis    POCT Pregnancy    VAGINAL PATHOGENS DNA PANEL    Chlamydia/GC, PCR (Genital/Anal swab)    nitrofurantoin (MACROBID) 100 MG Cap    metroNIDAZOLE (FLAGYL) 500 MG Tab      2. BV (bacterial vaginosis)  metroNIDAZOLE (FLAGYL) 500 MG Tab      3. Acute cystitis " with hematuria  POCT Urinalysis    POCT Pregnancy    VAGINAL PATHOGENS DNA PANEL    nitrofurantoin (MACROBID) 100 MG Cap        Results for orders placed or performed in visit on 11/23/24   POCT Urinalysis    Collection Time: 11/23/24  2:14 PM   Result Value Ref Range    POC Color yelllow Negative    POC Appearance clear Negative    POC Glucose neg Negative mg/dL    POC Bilirubin small Negative mg/dL    POC Ketones neg Negative mg/dL    POC Specific Gravity 1.025 <1.005 - >1.030    POC Blood moderate Negative    POC Urine PH 6.0 5.0 - 8.0    POC Protein 30 Negative mg/dL    POC Urobiligen 0.2 Negative (0.2) mg/dL    POC Nitrites positive Negative    POC Leukocyte Esterase small Negative   POCT Pregnancy    Collection Time: 11/23/24  2:14 PM   Result Value Ref Range    POC Urine Pregnancy Test Negative     Internal Control Positive Positive     Internal Control Negative Negative         IMPRESSION: The patient is well appearing here with reassuring exam and vitals signs. Symptomatology is consistent with UTI and UA is remarkable for nitrites and small LEUKS  They are discharged home with a course of macrobid.  Have completed swabs today and will notify when results are available.   Advised to refrain from sexual activity until testing and treatment complete if necessary     Differential diagnosis discussed. Pt was Educated on red flag symptoms. Pt has been Instructed to return to Urgent Care or nearest Emergency Department if symptoms fail to improve, for any change in condition, further concerns, or new concerning symptoms. Patient states understanding of the plan of care and discharge instructions.  They are discharged in stable condition.         Please note that this dictation was created using voice recognition software. I have made a reasonable attempt to correct obvious errors, but I expect that there are errors of grammar and possibly content that I did not discover before finalizing the note.    This note  was electronically signed by KINGA Buck

## 2024-12-22 ENCOUNTER — APPOINTMENT (OUTPATIENT)
Dept: RADIOLOGY | Facility: MEDICAL CENTER | Age: 24
End: 2024-12-22
Attending: EMERGENCY MEDICINE
Payer: MEDICAID

## 2024-12-22 ENCOUNTER — HOSPITAL ENCOUNTER (EMERGENCY)
Facility: MEDICAL CENTER | Age: 24
End: 2024-12-22
Attending: EMERGENCY MEDICINE
Payer: MEDICAID

## 2024-12-22 VITALS
OXYGEN SATURATION: 95 % | DIASTOLIC BLOOD PRESSURE: 83 MMHG | SYSTOLIC BLOOD PRESSURE: 136 MMHG | HEART RATE: 110 BPM | WEIGHT: 228.4 LBS | RESPIRATION RATE: 18 BRPM | TEMPERATURE: 98.1 F | BODY MASS INDEX: 42.03 KG/M2 | HEIGHT: 62 IN

## 2024-12-22 DIAGNOSIS — J06.9 UPPER RESPIRATORY TRACT INFECTION, UNSPECIFIED TYPE: ICD-10-CM

## 2024-12-22 LAB
EKG IMPRESSION: NORMAL
FLUAV RNA SPEC QL NAA+PROBE: NEGATIVE
FLUBV RNA SPEC QL NAA+PROBE: NEGATIVE
RSV RNA SPEC QL NAA+PROBE: NEGATIVE
S PYO DNA SPEC NAA+PROBE: NOT DETECTED
SARS-COV-2 RNA RESP QL NAA+PROBE: NOTDETECTED

## 2024-12-22 PROCEDURE — 71046 X-RAY EXAM CHEST 2 VIEWS: CPT

## 2024-12-22 PROCEDURE — 700102 HCHG RX REV CODE 250 W/ 637 OVERRIDE(OP): Mod: UD | Performed by: EMERGENCY MEDICINE

## 2024-12-22 PROCEDURE — 0241U HCHG SARS-COV-2 COVID-19 NFCT DS RESP RNA 4 TRGT ED POC: CPT

## 2024-12-22 PROCEDURE — 93005 ELECTROCARDIOGRAM TRACING: CPT | Mod: TC | Performed by: EMERGENCY MEDICINE

## 2024-12-22 PROCEDURE — 99406 BEHAV CHNG SMOKING 3-10 MIN: CPT

## 2024-12-22 PROCEDURE — 93005 ELECTROCARDIOGRAM TRACING: CPT | Mod: TC

## 2024-12-22 PROCEDURE — A9270 NON-COVERED ITEM OR SERVICE: HCPCS | Mod: UD | Performed by: EMERGENCY MEDICINE

## 2024-12-22 PROCEDURE — 87651 STREP A DNA AMP PROBE: CPT

## 2024-12-22 PROCEDURE — 99284 EMERGENCY DEPT VISIT MOD MDM: CPT

## 2024-12-22 RX ORDER — FLUTICASONE PROPIONATE 50 MCG
1 SPRAY, SUSPENSION (ML) NASAL DAILY
Qty: 16 G | Refills: 0 | Status: SHIPPED | OUTPATIENT
Start: 2024-12-22 | End: 2024-12-27

## 2024-12-22 RX ORDER — ALBUTEROL SULFATE 90 UG/1
2 INHALANT RESPIRATORY (INHALATION) ONCE
Status: COMPLETED | OUTPATIENT
Start: 2024-12-22 | End: 2024-12-22

## 2024-12-22 RX ORDER — ALBUTEROL SULFATE 90 UG/1
2 INHALANT RESPIRATORY (INHALATION) EVERY 6 HOURS PRN
Qty: 8.5 G | Refills: 0 | Status: SHIPPED | OUTPATIENT
Start: 2024-12-22

## 2024-12-22 RX ORDER — IBUPROFEN 600 MG/1
600 TABLET, FILM COATED ORAL ONCE
Status: COMPLETED | OUTPATIENT
Start: 2024-12-22 | End: 2024-12-22

## 2024-12-22 RX ADMIN — IBUPROFEN 600 MG: 600 TABLET, FILM COATED ORAL at 17:08

## 2024-12-22 RX ADMIN — ALBUTEROL SULFATE 2 PUFF: 90 AEROSOL, METERED RESPIRATORY (INHALATION) at 17:08

## 2024-12-22 NOTE — ED TRIAGE NOTES
"Chief Complaint   Patient presents with    Cough    Sore Throat     Cough, sore throat, congestion x three days     BP (!) 143/99   Pulse (!) 113   Temp 36.7 °C (98.1 °F) (Temporal)   Resp 18   Ht 1.575 m (5' 2\")   Wt 104 kg (228 lb 6.3 oz)   SpO2 96%   BMI 41.77 kg/m²     25 y/o female presents to ED with complaint of three days of cough, congestion, sore throat.  She states she has also had sharp chest pain with coughing and deep inspiration.  She does smoke.  Chills at home. Took motrin this morning. No prior cardiac or lung hx.     Awake, alert, no obvious distress in triage.     POC viral swab collected and running.   "

## 2024-12-22 NOTE — ED PROVIDER NOTES
ER Provider Note    Scribed for Reji Stewart M.d. by Herber Goodwin. 12/22/2024  3:05 PM    Primary Care Provider: Pcp Pt States None    CHIEF COMPLAINT   Chief Complaint   Patient presents with    Cough    Sore Throat     Cough, sore throat, congestion x three days     EXTERNAL RECORDS REVIEWED  Outpatient Notes Patient tested positive for BV at her last urgent care visit 11/24    HPI/ROS  LIMITATION TO HISTORY   Select: : None  OUTSIDE HISTORIAN(S):  None.     Esther Lr is a 24 y.o. female who presents to the ED complaining of a cough and sore throat onset three days ago. Patient reports that she has associated sore throat, congestion, and bilateral ear pain, which came on three days ago as well. Patient reports having some chest pain and shortness of breath that she noticed this morning, and describes her chest pain as sharp. Patient believes that she may have had a fever last night but did not record a temperature. Patient denies any dental pain. She has been treating with Motrin and Nyquil at home and has had minimal relief. She has never had an inhaler. Patient is not immunocompromised. She does smoke tobacco.       PAST MEDICAL HISTORY  History reviewed. No pertinent past medical history.    SURGICAL HISTORY  History reviewed. No pertinent surgical history.    FAMILY HISTORY  History reviewed. No pertinent family history.    SOCIAL HISTORY   reports that she has been smoking cigarettes. She has never used smokeless tobacco. She reports current alcohol use of about 0.6 oz of alcohol per week. She reports that she does not currently use drugs.    CURRENT MEDICATIONS  Discharge Medication List as of 12/22/2024  7:26 PM        CONTINUE these medications which have NOT CHANGED    Details   ESTARYLLA 0.25-35 MG-MCG per tablet Take 1 Tablet by mouth every day., VALERY, Historical Med      ondansetron (ZOFRAN ODT) 4 MG TABLET DISPERSIBLE 1 TAB, ALLOW TO DISINTEGRATE IN MOUTH, EVERY 8 HOURS ONLY IF  "NEEDED FOR NAUSEA OR VOMITING., Disp-15 Tablet, R-0, Normal      dicyclomine (BENTYL) 20 MG Tab 1 TAB BY MOUTH EVERY 6 HOURS ONLY IF NEEDED FOR DIARRHEA AND/OR ABDOMINAL CRAMPS., Disp-20 Tablet, R-0, Normal             ALLERGIES  Patient has no known allergies.    PHYSICAL EXAM  BP (!) 143/99   Pulse (!) 113   Temp 36.7 °C (98.1 °F) (Temporal)   Resp 18   Ht 1.575 m (5' 2\")   Wt 104 kg (228 lb 6.3 oz)   SpO2 96%   BMI 41.77 kg/m²   Constitutional: Alert in no apparent distress.  HENT: No signs of trauma, Bilateral external ears normal, Nose normal. Uvula midline.   Eyes: Pupils are equal and reactive, Conjunctiva normal, Non-icteric.   Neck: Normal range of motion, No tenderness, Supple, No stridor.   Lymphatic: No lymphadenopathy noted.   Cardiovascular: Tachycardia with regular rhythm, no murmurs.   Thorax & Lungs: Normal breath sounds, No respiratory distress, No wheezing, No chest tenderness.   Abdomen: Bowel sounds normal, Soft, No tenderness, No peritoneal signs, No masses, No pulsatile masses.   Skin: Warm, Dry, No erythema, No rash.   Back: No bony tenderness, No CVA tenderness.   Extremities: Intact distal pulses, No edema, No tenderness, No cyanosis.  Musculoskeletal: Good range of motion in all major joints. No tenderness to palpation or major deformities noted.   Neurologic: Alert , Normal motor function, Normal sensory function, No focal deficits noted.   Psychiatric: Affect normal, Judgment normal, Mood normal.      DIAGNOSTIC STUDIES    EKG/LABS  Labs Reviewed   GROUP A STREP BY PCR   POCT COV-2, FLU A/B, RSV BY PCR   POC COV-2, FLU A/B, RSV BY PCR      Results for orders placed or performed during the hospital encounter of 12/22/24   EKG   Result Value Ref Range    Report       [facility]    Test Date:  2024-12-22  Pt Name:    CHAITANYA XIAO             Department: ER  MRN:        [id number]                      Room:       [facility]  Gender:     Female           "             Technician: [id number]  :        2000                   Requested By:ER TRIAGE PROTOCOL  Order #:    [id number]                    Reading MD: Reji Stewart MD    Measurements  Intervals                                Axis  Rate:       108                          P:          10  AL:         109                          QRS:        125  QRSD:       84                           T:          -7  QT:         342  QTc:        459    Interpretive Statements  Sinus tachycardia  No previous ECG available for comparison  Electronically Signed On 2024 20:35:10 PST by Reji Stewart MD        I have independently interpreted this EKG    Radiology:   This attending emergency physician has independently interpreted the diagnostic imaging associated with this visit and is awaiting the final reading from the radiologist.   Preliminary interpretation is a follows: no PTX    Radiologist interpretation:   DX-CHEST-2 VIEWS   Final Result         1. No active cardiopulmonary abnormalities are identified.             COURSE & MEDICAL DECISION MAKING     ASSESSMENT, COURSE AND PLAN  Care Narrative:     3:05 PM - Patient seen and examined at bedside. Patient arrives today with flu like symptoms including cough, congestion, sore throat, ear pain, and began having chest pain and shortness of breath this morning. Discussed plan of care, including evaluation via lab work and imaging today. Patient agrees to the plan of care.     Reji Stewart M.D. spent greater than 3 minutes with the patient explaining the importance of smoking cessation.      No: muffled voice, drooling, stridor, tripoding, trismus, crepitus or trauma.   No e/o pna on CXR.    Hx not c/w PE given other infectious sx present  Tachycardia upon dc likely 2/2 uri and albuterol tx  No e/o stridor or tongue elevation and pt w/ FROM of neck w/o pain, doubt deep space neck infection  No e/o PTA on exam  No rash or e/o cellulitis     DISPOSITION AND  DISCUSSIONS      Escalation of care considered, and ultimately not performed: acute inpatient care management, however at this time, the patient is most appropriate for outpatient management.         The patient will return for new or worsening symptoms and is stable at the time of discharge.    DISPOSITION:  Patient will be discharged home in stable condition.    FOLLOW UP:  [facility] Emergency Dept  [address] [id number] [phone]    If symptoms worsen    [facility]  [address] [facility] [address]  [phone]  In 3 days        OUTPATIENT MEDICATIONS:  Discharge Medication List as of 12/22/2024  7:26 PM        START taking these medications    Details   fluticasone (FLONASE) 50 MCG/ACT nasal spray Administer 1 Spray into affected nostril(S) every day for 5 days., Disp-16 g, R-0, Normal      albuterol 108 (90 Base) MCG/ACT Aero Soln inhalation aerosol Inhale 2 Puffs every 6 hours as needed for Shortness of Breath., Disp-8.5 g, R-0, Normal              FINAL DIANGOSIS  1. Upper respiratory tract infection, unspecified type           I, Herber Goodwin (Scribe), am scribing for, and in the presence of, Reji Stewart M.D..    Electronically signed by: Herber Goodwin (Scribe), 12/22/2024    IReji M.D. personally performed the services described in this documentation, as scribed by Herber Goodwin in my presence, and it is both accurate and complete.      The note accurately reflects work and decisions made by me.  Reji Stewart M.D.  12/22/2024  8:35 PM

## 2024-12-23 NOTE — ED NOTES
Discharge instructions reviewed with patient and signed. Patient instructed on how to  prescriptions. They verbalized understanding of follow up instructions. All belongings with patient. Ambulate with a steady gait

## 2025-04-09 ENCOUNTER — NON-PROVIDER VISIT (OUTPATIENT)
Dept: URGENT CARE | Facility: PHYSICIAN GROUP | Age: 25
End: 2025-04-09

## 2025-04-09 DIAGNOSIS — Z02.1 PRE-EMPLOYMENT DRUG SCREENING: ICD-10-CM

## 2025-04-09 LAB
AMP AMPHETAMINE: NORMAL
COC COCAINE: NORMAL
INT CON NEG: NORMAL
INT CON POS: NORMAL
MET METHAMPHETAMINES: NORMAL
OPI OPIATES: NORMAL
PCP PHENCYCLIDINE: NORMAL
POC DRUG COMMENT 753798-OCCUPATIONAL HEALTH: NORMAL
THC: NORMAL

## 2025-04-09 PROCEDURE — 80305 DRUG TEST PRSMV DIR OPT OBS: CPT

## 2025-08-28 ENCOUNTER — OFFICE VISIT (OUTPATIENT)
Dept: URGENT CARE | Facility: PHYSICIAN GROUP | Age: 25
End: 2025-08-28
Payer: MEDICAID

## 2025-08-28 VITALS
BODY MASS INDEX: 39.01 KG/M2 | HEART RATE: 125 BPM | SYSTOLIC BLOOD PRESSURE: 124 MMHG | RESPIRATION RATE: 14 BRPM | DIASTOLIC BLOOD PRESSURE: 100 MMHG | WEIGHT: 212 LBS | OXYGEN SATURATION: 97 % | TEMPERATURE: 98 F | HEIGHT: 62 IN

## 2025-08-28 DIAGNOSIS — R42 DIZZINESS: ICD-10-CM

## 2025-08-28 PROCEDURE — 3080F DIAST BP >= 90 MM HG: CPT

## 2025-08-28 PROCEDURE — 99213 OFFICE O/P EST LOW 20 MIN: CPT

## 2025-08-28 PROCEDURE — 3074F SYST BP LT 130 MM HG: CPT

## 2025-08-28 ASSESSMENT — ENCOUNTER SYMPTOMS
FATIGUE: 0
SORE THROAT: 0
FEVER: 0
VOMITING: 0
DIZZINESS: 1
NAUSEA: 0
ABDOMINAL PAIN: 0

## 2025-08-29 ASSESSMENT — ENCOUNTER SYMPTOMS
CHILLS: 0
SPEECH CHANGE: 0
TINGLING: 0
SENSORY CHANGE: 0
HEADACHES: 0